# Patient Record
(demographics unavailable — no encounter records)

---

## 2025-02-12 NOTE — HISTORY OF PRESENT ILLNESS
[FreeTextEntry8] : 44 y/o male presents c/o painful and cold hands and feet for 2 months. He has done intermittent fasting and is taking magnesium, now super enzymes. Pain in extremities improves with magnesium, b vitamins and compression stockings. No known aggravating factors. No numbness or tingling, joint pain or fatigue. Pt states he does not want to be on blood pressure medication long term.   Pt is taking multiple supplements- now super enzymes  vitamin b12 1000 qd folate 400 qd berberine 500 mg qd omega 3 fish oil vitamin D3 25 mcg qd urolithin- A 200 mg qd resveratrol 200 mg qd zinc 50 mg qd  mag citrate 200 mg qd

## 2025-02-12 NOTE — PLAN
[FreeTextEntry1] :  #Cold intolerance -Possible Raynaud's syndrome -US b/l LE arterial pulses  -TFTs, iron + tibc, ferritin, vitamin b12, vitamin D, RONIT, ESR, CRP, RF, CCP, uric acid, mag, CBC w. diff, CMP, A1C -Rewarming techniques discussed -Rheumatology consult if no improvement  #HTN -Discussed HCTZ or enalapril for BP control since patient states he does not want medication long term- will try to start tapering but explained depends on his BP response given weight loss and now controlled BP  Risks with supplements including possible renal/hepatic effects discussed   Labs drawn in office

## 2025-02-12 NOTE — PHYSICAL EXAM
[No Acute Distress] : no acute distress [Well Developed] : well developed [Well-Appearing] : well-appearing [Normal Sclera/Conjunctiva] : normal sclera/conjunctiva [EOMI] : extraocular movements intact [Supple] : supple [Thyroid Normal, No Nodules] : the thyroid was normal and there were no nodules present [No Respiratory Distress] : no respiratory distress  [No Accessory Muscle Use] : no accessory muscle use [Clear to Auscultation] : lungs were clear to auscultation bilaterally [Normal Rate] : normal rate  [Regular Rhythm] : with a regular rhythm [Normal S1, S2] : normal S1 and S2 [Grossly Normal Strength/Tone] : grossly normal strength/tone [No Focal Deficits] : no focal deficits [Normal Affect] : the affect was normal [Normal Insight/Judgement] : insight and judgment were intact [Pedal Pulses Present] : the pedal pulses are present [de-identified] : warm